# Patient Record
Sex: MALE | Race: WHITE | NOT HISPANIC OR LATINO | Employment: OTHER | URBAN - NONMETROPOLITAN AREA
[De-identification: names, ages, dates, MRNs, and addresses within clinical notes are randomized per-mention and may not be internally consistent; named-entity substitution may affect disease eponyms.]

---

## 2019-01-10 ENCOUNTER — OFFICE VISIT (OUTPATIENT)
Dept: CARDIOLOGY | Facility: CLINIC | Age: 64
End: 2019-01-10

## 2019-01-10 ENCOUNTER — APPOINTMENT (OUTPATIENT)
Dept: LAB | Facility: HOSPITAL | Age: 64
End: 2019-01-10
Attending: INTERNAL MEDICINE

## 2019-01-10 VITALS
WEIGHT: 182.6 LBS | OXYGEN SATURATION: 95 % | SYSTOLIC BLOOD PRESSURE: 145 MMHG | DIASTOLIC BLOOD PRESSURE: 87 MMHG | BODY MASS INDEX: 24.73 KG/M2 | HEIGHT: 72 IN | HEART RATE: 73 BPM

## 2019-01-10 DIAGNOSIS — R07.9 CHEST PAIN, UNSPECIFIED TYPE: Primary | ICD-10-CM

## 2019-01-10 DIAGNOSIS — I10 ESSENTIAL HYPERTENSION: ICD-10-CM

## 2019-01-10 LAB
ALBUMIN SERPL-MCNC: 4.5 G/DL (ref 3.4–4.8)
ALBUMIN/GLOB SERPL: 1.4 G/DL (ref 1.5–2.5)
ALP SERPL-CCNC: 77 U/L (ref 40–129)
ALT SERPL W P-5'-P-CCNC: 16 U/L (ref 10–44)
ANION GAP SERPL CALCULATED.3IONS-SCNC: 6 MMOL/L (ref 3.6–11.2)
APTT PPP: 26.3 SECONDS (ref 23.8–36.1)
AST SERPL-CCNC: 20 U/L (ref 10–34)
BILIRUB SERPL-MCNC: 1 MG/DL (ref 0.2–1.8)
BUN BLD-MCNC: 10 MG/DL (ref 7–21)
BUN/CREAT SERPL: 9.3 (ref 7–25)
CALCIUM SPEC-SCNC: 9.8 MG/DL (ref 7.7–10)
CHLORIDE SERPL-SCNC: 101 MMOL/L (ref 99–112)
CO2 SERPL-SCNC: 30 MMOL/L (ref 24.3–31.9)
CREAT BLD-MCNC: 1.07 MG/DL (ref 0.43–1.29)
DEPRECATED FTI SERPL-MCNC: 3.9 TBI
DEPRECATED RDW RBC AUTO: 41.4 FL (ref 37–54)
ERYTHROCYTE [DISTWIDTH] IN BLOOD BY AUTOMATED COUNT: 12.3 % (ref 11.5–14.5)
GFR SERPL CREATININE-BSD FRML MDRD: 70 ML/MIN/1.73
GLOBULIN UR ELPH-MCNC: 3.2 GM/DL
GLUCOSE BLD-MCNC: 111 MG/DL (ref 70–110)
HCT VFR BLD AUTO: 48.8 % (ref 42–52)
HGB BLD-MCNC: 16.9 G/DL (ref 14–18)
INR PPP: 1.03 (ref 0.9–1.1)
MCH RBC QN AUTO: 32 PG (ref 27–33)
MCHC RBC AUTO-ENTMCNC: 34.6 G/DL (ref 33–37)
MCV RBC AUTO: 92.4 FL (ref 80–94)
OSMOLALITY SERPL CALC.SUM OF ELEC: 273.6 MOSM/KG (ref 273–305)
PLATELET # BLD AUTO: 217 10*3/MM3 (ref 130–400)
PMV BLD AUTO: 13 FL (ref 6–10)
POTASSIUM BLD-SCNC: 4.3 MMOL/L (ref 3.5–5.3)
PROT SERPL-MCNC: 7.7 G/DL (ref 6–8)
PROTHROMBIN TIME: 13.8 SECONDS (ref 11–15.4)
RBC # BLD AUTO: 5.28 10*6/MM3 (ref 4.7–6.1)
SODIUM BLD-SCNC: 137 MMOL/L (ref 135–153)
T3RU NFR SERPL: 37.1 % (ref 22.5–37)
T4 SERPL-MCNC: 10.6 MCG/DL (ref 4.5–10.9)
TSH SERPL DL<=0.05 MIU/L-ACNC: 3.19 MIU/ML (ref 0.55–4.78)
WBC NRBC COR # BLD: 9.35 10*3/MM3 (ref 4.5–12.5)

## 2019-01-10 PROCEDURE — 85610 PROTHROMBIN TIME: CPT | Performed by: INTERNAL MEDICINE

## 2019-01-10 PROCEDURE — 80053 COMPREHEN METABOLIC PANEL: CPT | Performed by: INTERNAL MEDICINE

## 2019-01-10 PROCEDURE — 84436 ASSAY OF TOTAL THYROXINE: CPT | Performed by: INTERNAL MEDICINE

## 2019-01-10 PROCEDURE — 36415 COLL VENOUS BLD VENIPUNCTURE: CPT | Performed by: INTERNAL MEDICINE

## 2019-01-10 PROCEDURE — 84479 ASSAY OF THYROID (T3 OR T4): CPT | Performed by: INTERNAL MEDICINE

## 2019-01-10 PROCEDURE — 93005 ELECTROCARDIOGRAM TRACING: CPT | Performed by: INTERNAL MEDICINE

## 2019-01-10 PROCEDURE — 99204 OFFICE O/P NEW MOD 45 MIN: CPT | Performed by: INTERNAL MEDICINE

## 2019-01-10 PROCEDURE — 85027 COMPLETE CBC AUTOMATED: CPT | Performed by: INTERNAL MEDICINE

## 2019-01-10 PROCEDURE — 84443 ASSAY THYROID STIM HORMONE: CPT | Performed by: INTERNAL MEDICINE

## 2019-01-10 PROCEDURE — 85730 THROMBOPLASTIN TIME PARTIAL: CPT | Performed by: INTERNAL MEDICINE

## 2019-01-10 RX ORDER — CARVEDILOL 12.5 MG/1
12.5 TABLET ORAL 2 TIMES DAILY
Refills: 2 | COMMUNITY
Start: 2019-01-04

## 2019-01-10 RX ORDER — ALBUTEROL SULFATE 90 UG/1
2 AEROSOL, METERED RESPIRATORY (INHALATION) EVERY 4 HOURS PRN
COMMUNITY

## 2019-01-10 RX ORDER — OMEPRAZOLE 40 MG/1
CAPSULE, DELAYED RELEASE ORAL
Refills: 2 | COMMUNITY
Start: 2018-12-20

## 2019-01-10 RX ORDER — LISINOPRIL 40 MG/1
40 TABLET ORAL DAILY
Refills: 2 | COMMUNITY
Start: 2018-12-07

## 2019-01-10 RX ORDER — TIOTROPIUM BROMIDE 18 UG/1
CAPSULE ORAL; RESPIRATORY (INHALATION)
Refills: 2 | COMMUNITY
Start: 2019-01-04

## 2019-01-10 RX ORDER — ATORVASTATIN CALCIUM 10 MG/1
10 TABLET, FILM COATED ORAL
Refills: 2 | COMMUNITY
Start: 2018-12-20

## 2019-01-10 NOTE — H&P (VIEW-ONLY)
[facility] CARDIOLOGY  [address] [address]  [place] [address]  Phone: [phone]  Fax: [phone]    01/10/2019    Chief Complaint   Patient presents with   • Chest Pain   • Abnormal stress test        History:   Larry Yi is a 63 y.o. male seen in consultation, referred by Dr.Abdul Ignacio MD  for evaluation of abnormal stress test.  His only past medical history is hypertension, he has no history of diabetes.  I do not know his lipid status orifice the thyroid status.  He's been having chest pain for a few months now, he feels over the past few weeks it has been gradually worsening.  His pain is a central substernal also he has some burning in his right and left chest wall region, his sore substernal chest pressure though eczema the middle the night, usually last for a few minutes, resolves spontaneously, no significant radiation, denies any diaphoresis, he also has exertional dyspnea which is a new for him over the past few months and has been progressively worsened.  He also has history of long-standing tobacco use and COPD from tobacco smoking.  He saw Dr. Pierre, cardiologist at the [facility] in [place], he did a standard Osito treadmill stress test and had 1 mm ST depression.  He remembered having chest pain on the treadmill.    Past Medical History:   Diagnosis Date   • Hypertension        History reviewed. No pertinent surgical history.     Review of Systems   Constitution: Positive for weakness and malaise/fatigue. Negative for diaphoresis, fever, weight gain and weight loss.   HENT: Positive for congestion. Negative for nosebleeds and sore throat.    Eyes: Negative for blurred vision and visual disturbance.   Cardiovascular: Positive for chest pain, dyspnea on exertion, irregular heartbeat, palpitations and paroxysmal nocturnal dyspnea. Negative for claudication, leg swelling, orthopnea and syncope.   Respiratory: Positive for shortness of breath, sleep  disturbances due to breathing, snoring and wheezing. Negative for cough, hemoptysis and sputum production.    Endocrine: Negative for cold intolerance, heat intolerance, polydipsia, polyphagia and polyuria.   Hematologic/Lymphatic: Negative for bleeding problem.   Skin: Positive for dry skin, itching and rash.   Musculoskeletal: Negative for muscle cramps, muscle weakness and myalgias.   Gastrointestinal: Positive for heartburn and nausea. Negative for abdominal pain, constipation, diarrhea, hematemesis, hematochezia, hemorrhoids, melena and vomiting.   Genitourinary: Negative for hematuria and nocturia.   Neurological: Positive for excessive daytime sleepiness, dizziness, headaches and light-headedness. Negative for focal weakness, numbness, seizures and vertigo.   Psychiatric/Behavioral: Negative for depression, substance abuse and suicidal ideas.   Allergic/Immunologic: Negative for environmental allergies.         Past Social History:  Social History     Socioeconomic History   • Marital status:      Spouse name: Not on file   • Number of children: Not on file   • Years of education: Not on file   • Highest education level: Not on file   Tobacco Use   • Smoking status: Former Smoker   • Smokeless tobacco: Never Used       Past Family History:  History reviewed. No pertinent family history.    Current Outpatient Medications   Medication Sig Dispense Refill   • albuterol sulfate  (90 Base) MCG/ACT inhaler Inhale 2 puffs Every 4 (Four) Hours As Needed for Wheezing.     • atorvastatin (LIPITOR) 10 MG tablet Take 10 mg by mouth every night at bedtime.  2   • carvedilol (COREG) 12.5 MG tablet Take 12.5 mg by mouth 2 (Two) Times a Day.  2   • Fluticasone Furoate-Vilanterol (BREO ELLIPTA) 200-25 MCG/INH aerosol powder  inhaler Inhale.     • lisinopril (PRINIVIL,ZESTRIL) 40 MG tablet Take 40 mg by mouth Daily.  2   • omeprazole (priLOSEC) 40 MG capsule TAKE ONE CAPSULE BY MOUTH EVERY DAY BEFORE A MEAL  2      • SPIRIVA HANDIHALER 18 MCG per inhalation capsule INHALE CONTENTS OF 1 CAPSULE VIA HANDIHALER DAILY  2     No current facility-administered medications for this visit.         No Known Allergies      Objective:  Vitals:    01/10/19 1442   BP: 145/87   Pulse: 73   SpO2: 95%         Comfortable NAD  PERRL, conjunctiva clear  Neck supple, no JVD or thyromegaly appreciated  S1/S2 RRR, no m/r/g  Lungs CTA B, normal effort  Abdomen S/NT/ND (+) BS, no HSM appreciated  Extremities warm, no clubbing, cyanosis, or edema  Normal gait  No visible or palpable skin lesions  A/Ox4, mood and affect appropriate  Pulse exam:   Feet are warm bilateral  1+ edema bilateral  Capillary refill is normal  No evidence of ulceration or color change of the toes  PULSES  Right DP and PT are 1+ and Left DP and PT are 2+    DATA:                   ECG 12 Lead  Date/Time: 1/10/2019 2:30 PM  Performed by: Valente Prado MD  Authorized by: Valente Prado MD                A/P:  Chest pain, CCS class II angina.  Hypertension.  COPD.  Past tobacco use.  Cold intolerance.    Check labs including a CBC, CMP, lipid profile, TSH.  Check echocardiogram for evaluation of LV systolic and diastolic function and valvular abnormality.  Plan for cardiac catheter, right radial approach  Patient is already on aspirin, statin, beta blocker, ACE inhibitor.  I recommended adding a low-dose Imdur but he said his pain is tolerable and he does not want to take more medications at this point of time unless absolutely indicated.    Patient's Body mass index is 24.77 kg/m². BMI is within normal parameters. No follow-up required..         ICD-10-CM ICD-9-CM   1. Chest pain, unspecified type R07.9 786.50        Thank you for allowing me to participate in the care of Larry Yi. Feel free to contact me directly with any further questions or concerns.        Valente Prado MD, [facility]  Interventional Cardiology

## 2019-01-10 NOTE — PROGRESS NOTES
[facility] CARDIOLOGY  [address]  [place] [address]  Phone: [phone]  Fax: [phone]    01/10/2019    Chief Complaint   Patient presents with   • Chest Pain   • Abnormal stress test        History:   Larry Yi is a 63 y.o. male seen in consultation, referred by Dr.Abdul Ignacio MD  for evaluation of abnormal stress test.  His only past medical history is hypertension, he has no history of diabetes.  I do not know his lipid status orifice the thyroid status.  He's been having chest pain for a few months now, he feels over the past few weeks it has been gradually worsening.  His pain is a central substernal also he has some burning in his right and left chest wall region, his sore substernal chest pressure though eczema the middle the night, usually last for a few minutes, resolves spontaneously, no significant radiation, denies any diaphoresis, he also has exertional dyspnea which is a new for him over the past few months and has been progressively worsened.  He also has history of long-standing tobacco use and COPD from tobacco smoking.  He saw Dr. Pierre, cardiologist at the [facility] in [place], he did a standard Osito treadmill stress test and had 1 mm ST depression.  He remembered having chest pain on the treadmill.    Past Medical History:   Diagnosis Date   • Hypertension        History reviewed. No pertinent surgical history.     Review of Systems   Constitution: Positive for weakness and malaise/fatigue. Negative for diaphoresis, fever, weight gain and weight loss.   HENT: Positive for congestion. Negative for nosebleeds and sore throat.    Eyes: Negative for blurred vision and visual disturbance.   Cardiovascular: Positive for chest pain, dyspnea on exertion, irregular heartbeat, palpitations and paroxysmal nocturnal dyspnea. Negative for claudication, leg swelling, orthopnea and syncope.   Respiratory: Positive for shortness of breath, sleep  disturbances due to breathing, snoring and wheezing. Negative for cough, hemoptysis and sputum production.    Endocrine: Negative for cold intolerance, heat intolerance, polydipsia, polyphagia and polyuria.   Hematologic/Lymphatic: Negative for bleeding problem.   Skin: Positive for dry skin, itching and rash.   Musculoskeletal: Negative for muscle cramps, muscle weakness and myalgias.   Gastrointestinal: Positive for heartburn and nausea. Negative for abdominal pain, constipation, diarrhea, hematemesis, hematochezia, hemorrhoids, melena and vomiting.   Genitourinary: Negative for hematuria and nocturia.   Neurological: Positive for excessive daytime sleepiness, dizziness, headaches and light-headedness. Negative for focal weakness, numbness, seizures and vertigo.   Psychiatric/Behavioral: Negative for depression, substance abuse and suicidal ideas.   Allergic/Immunologic: Negative for environmental allergies.         Past Social History:  Social History     Socioeconomic History   • Marital status:      Spouse name: Not on file   • Number of children: Not on file   • Years of education: Not on file   • Highest education level: Not on file   Tobacco Use   • Smoking status: Former Smoker   • Smokeless tobacco: Never Used       Past Family History:  History reviewed. No pertinent family history.    Current Outpatient Medications   Medication Sig Dispense Refill   • albuterol sulfate  (90 Base) MCG/ACT inhaler Inhale 2 puffs Every 4 (Four) Hours As Needed for Wheezing.     • atorvastatin (LIPITOR) 10 MG tablet Take 10 mg by mouth every night at bedtime.  2   • carvedilol (COREG) 12.5 MG tablet Take 12.5 mg by mouth 2 (Two) Times a Day.  2   • Fluticasone Furoate-Vilanterol (BREO ELLIPTA) 200-25 MCG/INH aerosol powder  inhaler Inhale.     • lisinopril (PRINIVIL,ZESTRIL) 40 MG tablet Take 40 mg by mouth Daily.  2   • omeprazole (priLOSEC) 40 MG capsule TAKE ONE CAPSULE BY MOUTH EVERY DAY BEFORE A MEAL  2    • SPIRIVA HANDIHALER 18 MCG per inhalation capsule INHALE CONTENTS OF 1 CAPSULE VIA HANDIHALER DAILY  2     No current facility-administered medications for this visit.         No Known Allergies      Objective:  Vitals:    01/10/19 1442   BP: 145/87   Pulse: 73   SpO2: 95%         Comfortable NAD  PERRL, conjunctiva clear  Neck supple, no JVD or thyromegaly appreciated  S1/S2 RRR, no m/r/g  Lungs CTA B, normal effort  Abdomen S/NT/ND (+) BS, no HSM appreciated  Extremities warm, no clubbing, cyanosis, or edema  Normal gait  No visible or palpable skin lesions  A/Ox4, mood and affect appropriate  Pulse exam:   Feet are warm bilateral  1+ edema bilateral  Capillary refill is normal  No evidence of ulceration or color change of the toes  PULSES  Right DP and PT are 1+ and Left DP and PT are 2+    DATA:                   ECG 12 Lead  Date/Time: 1/10/2019 2:30 PM  Performed by: Valente Prado MD  Authorized by: Valente Prado MD                A/P:  Chest pain, CCS class II angina.  Hypertension.  COPD.  Past tobacco use.  Cold intolerance.    Check labs including a CBC, CMP, lipid profile, TSH.  Check echocardiogram for evaluation of LV systolic and diastolic function and valvular abnormality.  Plan for cardiac catheter, right radial approach  Patient is already on aspirin, statin, beta blocker, ACE inhibitor.  I recommended adding a low-dose Imdur but he said his pain is tolerable and he does not want to take more medications at this point of time unless absolutely indicated.    Patient's Body mass index is 24.77 kg/m². BMI is within normal parameters. No follow-up required..         ICD-10-CM ICD-9-CM   1. Chest pain, unspecified type R07.9 786.50        Thank you for allowing me to participate in the care of Larry Yi. Feel free to contact me directly with any further questions or concerns.        Valente Prado MD, [facility]  Interventional Cardiology

## 2019-01-14 ENCOUNTER — TELEPHONE (OUTPATIENT)
Dept: CARDIOLOGY | Facility: CLINIC | Age: 64
End: 2019-01-14

## 2019-01-14 NOTE — TELEPHONE ENCOUNTER
----- Message from Valente Prado MD sent at 1/10/2019  6:50 PM EST -----  Please call patient to and tell him his test was normal.   Thanks.

## 2019-01-15 ENCOUNTER — HOSPITAL ENCOUNTER (OUTPATIENT)
Dept: CARDIOLOGY | Facility: HOSPITAL | Age: 64
Discharge: HOME | End: 2019-01-15
Attending: INTERNAL MEDICINE | Admitting: INTERNAL MEDICINE

## 2019-01-15 DIAGNOSIS — I10 ESSENTIAL HYPERTENSION: ICD-10-CM

## 2019-01-15 DIAGNOSIS — R07.9 CHEST PAIN, UNSPECIFIED TYPE: ICD-10-CM

## 2019-01-15 PROCEDURE — 93306 TTE W/DOPPLER COMPLETE: CPT

## 2019-01-15 PROCEDURE — 93306 TTE W/DOPPLER COMPLETE: CPT | Performed by: INTERNAL MEDICINE

## 2019-01-16 PROBLEM — I10 ESSENTIAL HYPERTENSION: Status: ACTIVE | Noted: 2019-01-16

## 2019-01-16 LAB
BH CV ECHO MEAS - % IVS THICK: 0.27 %
BH CV ECHO MEAS - % LVPW THICK: 7 %
BH CV ECHO MEAS - ACS: 1.9 CM
BH CV ECHO MEAS - AO MAX PG: 4.5 MMHG
BH CV ECHO MEAS - AO MEAN PG: 2.2 MMHG
BH CV ECHO MEAS - AO ROOT AREA (BSA CORRECTED): 1.6
BH CV ECHO MEAS - AO ROOT AREA: 8.7 CM^2
BH CV ECHO MEAS - AO ROOT DIAM: 3.3 CM
BH CV ECHO MEAS - AO V2 MAX: 105.9 CM/SEC
BH CV ECHO MEAS - AO V2 MEAN: 67.6 CM/SEC
BH CV ECHO MEAS - AO V2 VTI: 26.2 CM
BH CV ECHO MEAS - BSA(HAYCOCK): 2.1 M^2
BH CV ECHO MEAS - BSA: 2 M^2
BH CV ECHO MEAS - BZI_BMI: 24.7 KILOGRAMS/M^2
BH CV ECHO MEAS - BZI_METRIC_HEIGHT: 182.9 CM
BH CV ECHO MEAS - BZI_METRIC_WEIGHT: 82.6 KG
BH CV ECHO MEAS - EDV(CUBED): 90.1 ML
BH CV ECHO MEAS - EDV(MOD-SP4): 47 ML
BH CV ECHO MEAS - EDV(TEICH): 91.6 ML
BH CV ECHO MEAS - EF(CUBED): 69.5 %
BH CV ECHO MEAS - EF(MOD-SP4): 68.1 %
BH CV ECHO MEAS - EF(TEICH): 61.3 %
BH CV ECHO MEAS - ESV(CUBED): 27.5 ML
BH CV ECHO MEAS - ESV(MOD-SP4): 15 ML
BH CV ECHO MEAS - ESV(TEICH): 35.5 ML
BH CV ECHO MEAS - FS: 32.7 %
BH CV ECHO MEAS - IVS/LVPW: 1.1
BH CV ECHO MEAS - IVSD: 1.2 CM
BH CV ECHO MEAS - IVSS: 1.2 CM
BH CV ECHO MEAS - LA DIMENSION: 3 CM
BH CV ECHO MEAS - LA/AO: 0.89
BH CV ECHO MEAS - LV DIASTOLIC VOL/BSA (35-75): 23 ML/M^2
BH CV ECHO MEAS - LV MASS(C)D: 186.2 GRAMS
BH CV ECHO MEAS - LV MASS(C)DI: 91 GRAMS/M^2
BH CV ECHO MEAS - LV MASS(C)S: 109.1 GRAMS
BH CV ECHO MEAS - LV MASS(C)SI: 53.3 GRAMS/M^2
BH CV ECHO MEAS - LV SYSTOLIC VOL/BSA (12-30): 7.3 ML/M^2
BH CV ECHO MEAS - LVIDD: 4.5 CM
BH CV ECHO MEAS - LVIDS: 3 CM
BH CV ECHO MEAS - LVLD AP4: 5.3 CM
BH CV ECHO MEAS - LVLS AP4: 4.8 CM
BH CV ECHO MEAS - LVOT AREA (M): 5.7 CM^2
BH CV ECHO MEAS - LVOT AREA: 5.5 CM^2
BH CV ECHO MEAS - LVOT DIAM: 2.7 CM
BH CV ECHO MEAS - LVPWD: 1.1 CM
BH CV ECHO MEAS - LVPWS: 1.2 CM
BH CV ECHO MEAS - MV A MAX VEL: 62.7 CM/SEC
BH CV ECHO MEAS - MV E MAX VEL: 47.9 CM/SEC
BH CV ECHO MEAS - MV E/A: 0.76
BH CV ECHO MEAS - PA ACC SLOPE: 1372 CM/SEC^2
BH CV ECHO MEAS - PA ACC TIME: 0.09 SEC
BH CV ECHO MEAS - PA PR(ACCEL): 39.4 MMHG
BH CV ECHO MEAS - RAP SYSTOLE: 10 MMHG
BH CV ECHO MEAS - RVDD: 0.87 CM
BH CV ECHO MEAS - RVSP: 49.3 MMHG
BH CV ECHO MEAS - SI(AO): 111 ML/M^2
BH CV ECHO MEAS - SI(CUBED): 30.6 ML/M^2
BH CV ECHO MEAS - SI(MOD-SP4): 15.6 ML/M^2
BH CV ECHO MEAS - SI(TEICH): 27.4 ML/M^2
BH CV ECHO MEAS - SV(AO): 227.3 ML
BH CV ECHO MEAS - SV(CUBED): 62.6 ML
BH CV ECHO MEAS - SV(MOD-SP4): 32 ML
BH CV ECHO MEAS - SV(TEICH): 56.1 ML
BH CV ECHO MEAS - TR MAX VEL: 313.6 CM/SEC

## 2019-01-23 ENCOUNTER — HOSPITAL ENCOUNTER (OUTPATIENT)
Facility: HOSPITAL | Age: 64
Setting detail: HOSPITAL OUTPATIENT SURGERY
Discharge: HOME | End: 2019-01-23
Attending: INTERNAL MEDICINE | Admitting: INTERNAL MEDICINE

## 2019-01-23 VITALS
WEIGHT: 182 LBS | RESPIRATION RATE: 16 BRPM | HEART RATE: 69 BPM | BODY MASS INDEX: 24.65 KG/M2 | SYSTOLIC BLOOD PRESSURE: 121 MMHG | DIASTOLIC BLOOD PRESSURE: 69 MMHG | OXYGEN SATURATION: 95 % | HEIGHT: 72 IN

## 2019-01-23 DIAGNOSIS — R07.9 CHEST PAIN, UNSPECIFIED TYPE: ICD-10-CM

## 2019-01-23 DIAGNOSIS — I10 ESSENTIAL HYPERTENSION: ICD-10-CM

## 2019-01-23 PROCEDURE — 99152 MOD SED SAME PHYS/QHP 5/>YRS: CPT | Performed by: INTERNAL MEDICINE

## 2019-01-23 PROCEDURE — 25010000002 MIDAZOLAM PER 1 MG: Performed by: INTERNAL MEDICINE

## 2019-01-23 PROCEDURE — 25010000002 HEPARIN (PORCINE) PER 1000 UNITS: Performed by: INTERNAL MEDICINE

## 2019-01-23 PROCEDURE — 93005 ELECTROCARDIOGRAM TRACING: CPT | Performed by: INTERNAL MEDICINE

## 2019-01-23 PROCEDURE — 93458 L HRT ARTERY/VENTRICLE ANGIO: CPT | Performed by: INTERNAL MEDICINE

## 2019-01-23 PROCEDURE — C1769 GUIDE WIRE: HCPCS | Performed by: INTERNAL MEDICINE

## 2019-01-23 PROCEDURE — C1894 INTRO/SHEATH, NON-LASER: HCPCS | Performed by: INTERNAL MEDICINE

## 2019-01-23 PROCEDURE — 25010000002 FENTANYL CITRATE (PF) 100 MCG/2ML SOLUTION: Performed by: INTERNAL MEDICINE

## 2019-01-23 PROCEDURE — 0 IOPAMIDOL PER 1 ML: Performed by: INTERNAL MEDICINE

## 2019-01-23 RX ORDER — FENTANYL CITRATE 50 UG/ML
INJECTION, SOLUTION INTRAMUSCULAR; INTRAVENOUS AS NEEDED
Status: DISCONTINUED | OUTPATIENT
Start: 2019-01-23 | End: 2019-01-23 | Stop reason: HOSPADM

## 2019-01-23 RX ORDER — HEPARIN SODIUM 1000 [USP'U]/ML
INJECTION, SOLUTION INTRAVENOUS; SUBCUTANEOUS AS NEEDED
Status: DISCONTINUED | OUTPATIENT
Start: 2019-01-23 | End: 2019-01-23 | Stop reason: HOSPADM

## 2019-01-23 RX ORDER — LIDOCAINE HYDROCHLORIDE 20 MG/ML
INJECTION, SOLUTION INFILTRATION; PERINEURAL AS NEEDED
Status: DISCONTINUED | OUTPATIENT
Start: 2019-01-23 | End: 2019-01-23 | Stop reason: HOSPADM

## 2019-01-23 RX ORDER — SODIUM CHLORIDE 9 MG/ML
INJECTION, SOLUTION INTRAVENOUS CONTINUOUS PRN
Status: COMPLETED | OUTPATIENT
Start: 2019-01-23 | End: 2019-01-23

## 2019-01-23 RX ORDER — MIDAZOLAM HYDROCHLORIDE 1 MG/ML
INJECTION INTRAMUSCULAR; INTRAVENOUS AS NEEDED
Status: DISCONTINUED | OUTPATIENT
Start: 2019-01-23 | End: 2019-01-23 | Stop reason: HOSPADM

## 2019-01-23 RX ORDER — SODIUM CHLORIDE 9 MG/ML
100 INJECTION, SOLUTION INTRAVENOUS CONTINUOUS
Status: DISCONTINUED | OUTPATIENT
Start: 2019-01-23 | End: 2019-01-23 | Stop reason: HOSPADM

## 2019-01-23 NOTE — DISCHARGE INSTR - ACTIVITY
Take it easy for the next several days to one week, no heavy lifting with right arm, keep cath site clean, dry, and covered. Do not submerge cath site underwater. Watch for signs of bleeding and infection, paperwork for signs and symptoms provided in discharge summary.  No driving or signing legal documents for 24 hours.

## 2019-02-01 ENCOUNTER — TELEPHONE (OUTPATIENT)
Dept: CARDIOLOGY | Facility: CLINIC | Age: 64
End: 2019-02-01

## 2019-02-01 NOTE — TELEPHONE ENCOUNTER
----- Message from Valente Prado MD sent at 1/18/2019  9:30 AM EST -----  Please call patient to and tell him his test was normal.   Thanks.

## 2019-02-07 ENCOUNTER — OFFICE VISIT (OUTPATIENT)
Dept: CARDIOLOGY | Facility: CLINIC | Age: 64
End: 2019-02-07

## 2019-02-07 VITALS
HEART RATE: 71 BPM | BODY MASS INDEX: 25.14 KG/M2 | WEIGHT: 185.4 LBS | SYSTOLIC BLOOD PRESSURE: 129 MMHG | DIASTOLIC BLOOD PRESSURE: 83 MMHG | OXYGEN SATURATION: 93 %

## 2019-02-07 DIAGNOSIS — I10 ESSENTIAL HYPERTENSION: Primary | ICD-10-CM

## 2019-02-07 PROCEDURE — 99213 OFFICE O/P EST LOW 20 MIN: CPT | Performed by: INTERNAL MEDICINE

## 2019-02-07 NOTE — PROGRESS NOTES
[facility] CARDIOLOGY  [address] [address]  [place] [address]  Phone: [phone]  Fax: [phone]    02/07/2019    Chief Complaint   Patient presents with   • Hypertension        History:   Larry Yi is a 63 y.o. male seen in followup, he had recent cardiac cath for evaluation of abnormal stress test which showed mild non obstructive CAD. He is here for post procedure f/u. He is not having any chest pain, no palpitations, no dyspnea.     Past Medical History:   Diagnosis Date   • Hyperlipidemia    • Hypertension        Past Surgical History:   Procedure Laterality Date   • CARDIAC CATHETERIZATION N/A 1/23/2019    Procedure: Left Heart Cath;  Surgeon: Valente Prado MD;  Location: [facility] CATH INVASIVE LOCATION;  Service: Cardiology        Past Social History:  Social History     Socioeconomic History   • Marital status:      Spouse name: Not on file   • Number of children: Not on file   • Years of education: Not on file   • Highest education level: Not on file   Tobacco Use   • Smoking status: Former Smoker   • Smokeless tobacco: Never Used   Substance and Sexual Activity   • Alcohol use: No     Frequency: Never   • Drug use: No   • Sexual activity: Defer       Past Family History:  History reviewed. No pertinent family history.    Review of Systems:   Review of Systems   Constitution: Positive for malaise/fatigue. Negative for diaphoresis, fever, weakness, weight gain and weight loss.   HENT: Negative for congestion, nosebleeds and sore throat.    Eyes: Negative for blurred vision and visual disturbance.   Cardiovascular: Positive for chest pain (not in a while), dyspnea on exertion and paroxysmal nocturnal dyspnea. Negative for claudication, irregular heartbeat, leg swelling, orthopnea, palpitations and syncope.   Respiratory: Positive for shortness of breath, snoring and wheezing. Negative for cough, hemoptysis, sleep disturbances due to breathing and  sputum production.    Endocrine: Positive for cold intolerance. Negative for heat intolerance, polydipsia, polyphagia and polyuria.   Hematologic/Lymphatic: Negative for bleeding problem.   Skin: Positive for dry skin, itching and rash.   Musculoskeletal: Negative for muscle cramps, muscle weakness and myalgias.   Gastrointestinal: Positive for heartburn and nausea. Negative for abdominal pain, constipation, diarrhea, hematemesis, hematochezia, hemorrhoids, melena and vomiting.   Genitourinary: Negative for hematuria and nocturia.   Neurological: Positive for excessive daytime sleepiness, dizziness and headaches. Negative for focal weakness, light-headedness, numbness, seizures and vertigo.   Psychiatric/Behavioral: Negative for depression, substance abuse and suicidal ideas.   Allergic/Immunologic: Negative for environmental allergies.         Current Outpatient Medications   Medication Sig Dispense Refill   • albuterol sulfate  (90 Base) MCG/ACT inhaler Inhale 2 puffs Every 4 (Four) Hours As Needed for Wheezing.     • atorvastatin (LIPITOR) 10 MG tablet Take 10 mg by mouth every night at bedtime.  2   • carvedilol (COREG) 12.5 MG tablet Take 12.5 mg by mouth 2 (Two) Times a Day.  2   • Fluticasone Furoate-Vilanterol (BREO ELLIPTA) 200-25 MCG/INH aerosol powder  inhaler Inhale.     • lisinopril (PRINIVIL,ZESTRIL) 40 MG tablet Take 40 mg by mouth Daily.  2   • omeprazole (priLOSEC) 40 MG capsule TAKE ONE CAPSULE BY MOUTH EVERY DAY BEFORE A MEAL  2   • SPIRIVA HANDIHALER 18 MCG per inhalation capsule INHALE CONTENTS OF 1 CAPSULE VIA HANDIHALER DAILY  2     No current facility-administered medications for this visit.         No Known Allergies    Objective     /83 (BP Location: Left arm, Patient Position: Sitting)   Pulse 71   Wt 84.1 kg (185 lb 6.4 oz)   SpO2 93%   BMI 25.14 kg/m²     Physical Exam   Constitutional: He is oriented to person, place, and time. He appears well-developed and  well-nourished.   HENT:   Head: Normocephalic and atraumatic.   Eyes: EOM are normal. Pupils are equal, round, and reactive to light.   Cardiovascular: Normal rate and regular rhythm.   Pulmonary/Chest: Effort normal and breath sounds normal.   Musculoskeletal: Normal range of motion.   Neurological: He is alert and oriented to person, place, and time.   Skin: Skin is warm and dry.   Psychiatric: He has a normal mood and affect.       DATA:      Results for orders placed during the hospital encounter of 01/15/19   Adult Transthoracic Echo Complete W/ Cont if Necessary Per Protocol    Narrative · Left ventricular systolic function is normal. Estimated EF appears to be   in the range of 56 - 60%.  · Mild pulmonary hypertension is present.  · There is no evidence of pericardial effusion             Results for orders placed during the hospital encounter of 01/23/19   Cardiac Catheterization/Vascular Study    Narrative                 CARDIAC CATHETERIZATION / INTERVENTION REPORT             DATE OF PROCEDURE: 1/23/2019       INDICATION FOR PROCEDURE:   Abnormal stress test  Chest pain concerning for angina  HTN  Dyslipidemia    PRE PROCEDURE DIAGNOSIS:  Abnormal stress test  Chest pain concerning for angina  HTN  Dyslipidemia    POST PROCEDURE DIAGNOSIS:  Mild non obstructive CAD  Normal LV systolic function  LV EDP 11 mmHg.    Face to face mdoerate conscious  sedation time : 39 min      COMPLICATIONS : None    Specimens collected : None     PROCEDURE PERFORMED:     1. Selective right and left Coronary Angiogram  2. Left heart catheretization    3. Left Ventriculography    Description of the procedure:  Prior to the procedure risk, benefits and possible alternative were   discussed with the patient and informed consent was obtained. Patient was   brought to cardiac cath lab table in post absorbtive state. Patient was   prepped and drape in usual sterile fashion. IV Versed and Fentanyl was   used for moderate sedation.  2% Lidocaine was used for topical anesthesia.   R radial arterial site was prepped and a micropuncture needle was used to   access the artery and a 5 F slender sheath was placed. 2.5 mg of Verapamil   and 200 mcg of NTG was given through the sheath intra arterial and 5000   units of Heparin was given once the catheter crossed the aortic arch.      5 F TIG 4 catheters was used for right and left coronary angiogram and 5   F pigtail catheter was used for Left heart hemodynamics and left   ventriculography. All the catheters were exchanged over 0.035 wire. The R   radial arterial sheath was removed and TR band was applied and immediate   and complete hemostasis was achieved. The patient tolerate the entire   procedure well without any immediate known complications.    Coronary anatomy findings:    LM: Is a large calibre vessel , normal take off from left cusp, divides   into LAD and Lcx. Mild Luminal irregulairities    LAD: Large calibre vessel, prox mid junction at take off S1 and D1 had   focal 30-50% stenosis, mid had 20 % long stenosis, distal mild luminal   irregularities. D1 had mild 30-40% stenosis in proximal    LCX: Moderate calibre vessel, mild luminal irregularities.     RCA: Large calibre, dominant artery, normal take off from right cusp.   There was mild diffuse disease throughout, PDA and PL had mild diffuse   disease.     Left Ventriculography:    LV systolic function was normal with visual estimated EF of 65%. No wall   motion abnormalities.   No significant mitral regurgitation noted.     LVEDP: 11 mmHg  No gradient across the aortic valve on pull back.       Final Impression:  Mild non obstructive CAD  Normal LV systolic function         Recommendations:  Aggressive guideline directed medical management for CAD         Valente Prado MD, [facility]  Interventional Cardiology    01/23/19  12:43 PM         Jayy Juarez was seen today for hypertension.    Diagnoses and all orders for this  visit:    Essential hypertension          Assessment:  1. Mild non obstructive CAD   2. HTN  3. Dyslipidemia        Plan:  1. Will recommend increase Lipitor 40 po qhs  2. Cont with current dose of Coreg and ACE-I.   3. His primary cardiologist is in [facility], can f/u there.           Discussed diet and weight loss with patient.        Patient's Body mass index is 25.14 kg/m². BMI is within normal parameters. No follow-up required..       No Follow-up on file.    Thank you for allowing me to participate in the care of Larry Yi. Feel free to contact me directly with any further questions or concerns.          Valente Prado MD, FACC  Interventional Cardiology

## (undated) DEVICE — GLIDESHEATH SLENDER STAINLESS STEEL KIT: Brand: GLIDESHEATH SLENDER

## (undated) DEVICE — CATH F5 INF PIG145 110CM 6SH: Brand: INFINITI

## (undated) DEVICE — PK CATH CARD 70

## (undated) DEVICE — RADIFOCUS OPTITORQUE ANGIOGRAPHIC CATHETER: Brand: OPTITORQUE

## (undated) DEVICE — TR BAND RADIAL ARTERY COMPRESSION DEVICE: Brand: TR BAND

## (undated) DEVICE — ADULT DISPOSABLE SINGLE-PATIENT USE PULSE OXIMETER SENSOR: Brand: NONIN

## (undated) DEVICE — DRAPE, RADIAL, STERILE: Brand: MEDLINE

## (undated) DEVICE — CANNULA,OXY,ADULT,SUPER SOFT,W/14'TUB,UC: Brand: MEDLINE INDUSTRIES, INC.

## (undated) DEVICE — ST INF PRI SMRTSTE 20DRP 2VLV 24ML 117

## (undated) DEVICE — RUNWAY RADL W/TOP PAD

## (undated) DEVICE — MODEL AT P65, P/N 701554-001KIT CONTENTS: HAND CONTROLLER, 3-WAY HIGH-PRESSURE STOPCOCK WITH ROTATING END AND PREMIUM HIGH-PRESSURE TUBING: Brand: ANGIOTOUCH® KIT

## (undated) DEVICE — A2000 MULTI-USE SYRINGE KIT, P/N 701277-003KIT CONTENTS: 100ML CONTRAST RESERVOIR AND TUBING WITH CONTRAST SPIKE AND CLAMP: Brand: A2000 MULTI-USE SYRINGE KIT

## (undated) DEVICE — ST EXT IV SMARTSITE 2VLV SP M LL 5ML IV1

## (undated) DEVICE — GW INQW FIX/CORE PTFE J/3MM .035 260CM

## (undated) DEVICE — DRSNG SURESITE WNDW 4X4.5